# Patient Record
Sex: FEMALE | Race: WHITE | NOT HISPANIC OR LATINO | Employment: FULL TIME | RURAL
[De-identification: names, ages, dates, MRNs, and addresses within clinical notes are randomized per-mention and may not be internally consistent; named-entity substitution may affect disease eponyms.]

---

## 2017-06-14 RX ORDER — THYROID,PORK 90 MG
TABLET ORAL
Qty: 90 TABLET | Refills: 1 | Status: SHIPPED | OUTPATIENT
Start: 2017-06-14 | End: 2017-12-11 | Stop reason: SDUPTHER

## 2017-12-11 RX ORDER — THYROID,PORK 90 MG
TABLET ORAL
Qty: 90 TABLET | Refills: 0 | Status: SHIPPED | OUTPATIENT
Start: 2017-12-11 | End: 2018-03-15 | Stop reason: SDUPTHER

## 2017-12-11 NOTE — TELEPHONE ENCOUNTER
Patient was called and advised due lab.  Patient will be in first week of January to have this done.

## 2018-01-30 DIAGNOSIS — E03.9 ACQUIRED HYPOTHYROIDISM: Primary | ICD-10-CM

## 2018-02-01 DIAGNOSIS — E03.9 ACQUIRED HYPOTHYROIDISM: ICD-10-CM

## 2018-03-15 RX ORDER — THYROID,PORK 90 MG
TABLET ORAL
Qty: 90 TABLET | Refills: 2 | Status: SHIPPED | OUTPATIENT
Start: 2018-03-15 | End: 2018-12-09 | Stop reason: SDUPTHER

## 2018-08-17 ENCOUNTER — OFFICE VISIT (OUTPATIENT)
Dept: FAMILY MEDICINE CLINIC | Facility: CLINIC | Age: 28
End: 2018-08-17

## 2018-08-17 VITALS
TEMPERATURE: 97.8 F | HEART RATE: 69 BPM | SYSTOLIC BLOOD PRESSURE: 104 MMHG | BODY MASS INDEX: 23.88 KG/M2 | DIASTOLIC BLOOD PRESSURE: 58 MMHG | HEIGHT: 63 IN | WEIGHT: 134.8 LBS | OXYGEN SATURATION: 99 %

## 2018-08-17 DIAGNOSIS — R52 PAIN: Primary | ICD-10-CM

## 2018-08-17 PROCEDURE — 99213 OFFICE O/P EST LOW 20 MIN: CPT | Performed by: FAMILY MEDICINE

## 2018-08-17 RX ORDER — PRENATAL VIT NO.126/IRON/FOLIC 28MG-0.8MG
TABLET ORAL DAILY
COMMUNITY

## 2018-08-17 NOTE — PROGRESS NOTES
Subjective   Yumiko Zuleta [facility] is a 28 y.o. female.     Ankle Pain    The incident occurred 5 to 7 days ago. The incident occurred at home. There was no injury mechanism. The pain is present in the right ankle and right foot. The quality of the pain is described as aching. The pain is moderate. The pain has been fluctuating since onset. Associated symptoms include an inability to bear weight. She reports no foreign bodies present. The symptoms are aggravated by weight bearing. She has tried elevation and ice for the symptoms. The treatment provided no relief.       The following portions of the patient's history were reviewed and updated as appropriate: allergies, current medications, past family history, past medical history, past social history, past surgical history and problem list.    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 She appears well-developed and well-nourished.   Musculoskeletal: She exhibits edema, tenderness and deformity.   Lateral malleolar swelling and tenderness   Neurological: She is alert and oriented to person, place, and time.   Skin: Capillary refill takes less than 2 seconds. There is pallor.   Psychiatric: She has a normal mood and affect. Her behavior is normal. Judgment and thought content normal.   Nursing note and vitals reviewed.      Assessment/Plan   Yumiko was seen today for ankle pain.    Diagnoses and all orders for this visit:    Pain           Plan nonweightbearing ankle support Aleve-foot and ankle orthopedist for acute and long-term management of congenital defect with calluses

## 2018-10-29 ENCOUNTER — TELEPHONE (OUTPATIENT)
Dept: FAMILY MEDICINE CLINIC | Facility: CLINIC | Age: 28
End: 2018-10-29

## 2018-12-10 RX ORDER — THYROID,PORK 90 MG
TABLET ORAL
Qty: 90 TABLET | Refills: 2 | Status: SHIPPED | OUTPATIENT
Start: 2018-12-10 | End: 2020-03-04 | Stop reason: SDUPTHER

## 2019-02-04 ENCOUNTER — TELEPHONE (OUTPATIENT)
Dept: FAMILY MEDICINE CLINIC | Facility: CLINIC | Age: 29
End: 2019-02-04

## 2019-02-04 NOTE — TELEPHONE ENCOUNTER
Patient has sore throat, laryngitis.  Is there any thing she can take?              Zyrtec-10 mg-1 a day plus gargle with ice salt water

## 2019-02-05 ENCOUNTER — TELEPHONE (OUTPATIENT)
Dept: FAMILY MEDICINE CLINIC | Facility: CLINIC | Age: 29
End: 2019-02-05

## 2019-02-05 NOTE — TELEPHONE ENCOUNTER
HOARSE, PROD COUGH-YELLOW SPUTUM, DOESN'T FEEL BAD, NO FEVER. CAN SHE TAKE SOMETHING OTC OR RX?        [facility]        *the same as yesterday

## 2019-07-09 ENCOUNTER — TELEPHONE (OUTPATIENT)
Dept: PSYCHIATRY | Facility: CLINIC | Age: 29
End: 2019-07-09

## 2019-12-17 RX ORDER — THYROID,PORK 90 MG
TABLET ORAL
Qty: 90 TABLET | Refills: 0 | OUTPATIENT
Start: 2019-12-17

## 2019-12-17 NOTE — TELEPHONE ENCOUNTER
Spoke with pt.  States she does not need refill at this time.  Will call back with phone/fax number to send orders for lab.

## 2019-12-18 ENCOUNTER — TELEPHONE (OUTPATIENT)
Dept: FAMILY MEDICINE CLINIC | Facility: CLINIC | Age: 29
End: 2019-12-18

## 2019-12-18 NOTE — TELEPHONE ENCOUNTER
PT STATES SHE WILL NEED NEW RX FOR ARMOUR THRYOID SENT TO NEW PHARMACY-[facility]. PT STATES SHE HAS ENOUGH MED FOR NOW-SYSTEM SENT OUT REFILL YESTERDAY TOO EARLY.    CAN COMPLETE LABS-PLEASE SEND AN ORDER TO [facility] IN [facility]  [address] [place] [address]  [phone] (M)

## 2020-02-11 ENCOUNTER — TELEPHONE (OUTPATIENT)
Dept: FAMILY MEDICINE CLINIC | Facility: CLINIC | Age: 30
End: 2020-02-11

## 2020-02-11 DIAGNOSIS — E03.9 ACQUIRED HYPOTHYROIDISM: Primary | ICD-10-CM

## 2020-02-11 DIAGNOSIS — Z00.00 ANNUAL PHYSICAL EXAM: ICD-10-CM

## 2020-02-11 NOTE — TELEPHONE ENCOUNTER
PT IS NEEDING THYROID LAB ORDER AND ANY OTHER BLOOD WORK YOU MIGHT NEED TO CHECK  FAXED TO BELOW.  HAS ABOUT 3 WEEKS OF MEDS REMAINING.       [facility]  FAX:   [phone]  MAIN:  [phone]

## 2020-02-20 ENCOUNTER — HOSPITAL ENCOUNTER (OUTPATIENT)
Age: 30
Discharge: HOME | End: 2020-02-20
Payer: COMMERCIAL

## 2020-02-20 DIAGNOSIS — Z00.00: ICD-10-CM

## 2020-02-20 DIAGNOSIS — E03.9: Primary | ICD-10-CM

## 2020-02-20 LAB
ALANINE AMINOTRANSFERASE: 13 U/L (ref 4–35)
ALBUMIN LEVEL: 4.5 G/DL (ref 3.5–5.1)
ALBUMIN SPEC-MCNC: 4.5 G/DL (ref 3.5–5.1)
ALKALINE PHOSPHATASE: 65 U/L (ref 38–126)
ALP SERPL-CCNC: 65 U/L (ref 38–126)
ALT SERPL-CCNC: 13 U/L (ref 4–35)
ASPARTATE AMINO TRANSFERASE: 25 U/L (ref 14–36)
AST SERPL-CCNC: 25 U/L (ref 14–36)
BASIC METABOLIC AND HEMATOCRIT PNL BLD: 41.1 % (ref 37–47)
BILIRUBIN,TOTAL: 0.5 MG/DL (ref 0.2–1.3)
BLOOD UREA NITROGEN: 10 MG/DL (ref 7–17)
BUN SERPL-MCNC: 10 MG/DL (ref 7–17)
CALCIUM: 10 MG/DL (ref 8.4–10.2)
CARBON DIOXIDE: 29 MMOL/L (ref 22–30)
CHLORIDE SPEC-MCNC: 99 MMOL/L (ref 98–107)
CHLORIDE: 99 MMOL/L (ref 98–107)
CHOLEST SERPL-MCNC: 165 MG/DL (ref 0–200)
CHOLESTEROL: 165 MG/DL (ref 0–200)
ESTIMATED GLOMERULAR FILT RATE: > 60
FREE T4 FREE THYROXINE: 0.52 NG/ML (ref 0.78–2.19)
GFRSERPLBLD MDRD-ARVRAT: > 60 ML/MIN/{1.73_M2}
GLUCOSE SERPL-MCNC: 88 MG/DL (ref 65–105)
GLUCOSE: 88 MG/DL (ref 65–105)
HDL DIRECT: 63 MG/DL
HEMATOCRIT: 41.1 % (ref 37–47)
HEMOGLOBIN: 13.3 G/DL (ref 12–15)
HGB+HCT PNL BLD: 41.1 % (ref 37–47)
IPF CHARGE: (no result)
MCV RBC: 93.2 FL (ref 80–100)
MEAN CORPUSCULAR HEMOGLOBIN: 30.2 PG (ref 26–34)
MEAN CORPUSCULAR HGB CONC: 32.4 G/DL (ref 32–36)
PLATELET COUNT RESULT: 406 K/MM3 (ref 150–375)
POTASSIUM: 4.5 MMOL/L (ref 3.4–5)
PROT SERPL-MCNC: 8 G/DL (ref 6.3–8.2)
RED BLOOD COUNT: 4.41 M/MM3 (ref 4.2–5.4)
SODIUM: 140 MMOL/L (ref 137–145)
T4 FREE SERPL-MCNC: 0.52 NG/ML (ref 0.78–2.19)
THYROID STIMULATING HORMONE: 0.17 UIU/ML (ref 0.47–4.68)
TOTAL PROTEIN: 8 G/DL (ref 6.3–8.2)
TRIGLYCERIDES: 53 MG/DL (ref ?–150)
TSH SERPL-ACNC: 0.17 UIU/ML (ref 0.47–4.68)
WHITE BLOOD COUNT: 9.1 K/MM3 (ref 4.5–10)

## 2020-02-20 PROCEDURE — 84439 ASSAY OF FREE THYROXINE: CPT

## 2020-02-20 PROCEDURE — 84481 FREE ASSAY (FT-3): CPT

## 2020-02-20 PROCEDURE — 84443 ASSAY THYROID STIM HORMONE: CPT

## 2020-02-20 PROCEDURE — 80061 LIPID PANEL: CPT

## 2020-02-20 PROCEDURE — 80053 COMPREHEN METABOLIC PANEL: CPT

## 2020-02-20 PROCEDURE — 36415 COLL VENOUS BLD VENIPUNCTURE: CPT

## 2020-02-20 PROCEDURE — 85027 COMPLETE CBC AUTOMATED: CPT

## 2020-02-25 ENCOUNTER — RESULTS ENCOUNTER (OUTPATIENT)
Dept: FAMILY MEDICINE CLINIC | Facility: CLINIC | Age: 30
End: 2020-02-25

## 2020-02-25 DIAGNOSIS — Z00.00 ANNUAL PHYSICAL EXAM: ICD-10-CM

## 2020-02-25 DIAGNOSIS — E03.9 ACQUIRED HYPOTHYROIDISM: ICD-10-CM

## 2020-03-04 RX ORDER — LEVOTHYROXINE AND LIOTHYRONINE 57; 13.5 UG/1; UG/1
90 TABLET ORAL DAILY
Qty: 90 TABLET | Refills: 0 | Status: SHIPPED | OUTPATIENT
Start: 2020-03-04

## 2020-03-04 NOTE — TELEPHONE ENCOUNTER
PT IS NEEDING MED REFILL FOR ARMOUR THYROID      HAS ATTEMPTED TO LOCATE NEW MD BEING TOLD ITS 6-8 WKS OUT FOR APPT-ADVISED PT TO ESTABLISH CARE WITH ONE OF THEM SO THEY COULD FOLLOW REFILLS. WE COULD SEND IN 90 DAYS.       [facility], [place]

## 2021-04-14 ENCOUNTER — HOSPITAL ENCOUNTER (EMERGENCY)
Age: 31
Discharge: HOME | End: 2021-04-14
Payer: COMMERCIAL

## 2021-04-14 VITALS
OXYGEN SATURATION: 100 % | DIASTOLIC BLOOD PRESSURE: 71 MMHG | SYSTOLIC BLOOD PRESSURE: 113 MMHG | RESPIRATION RATE: 14 BRPM | HEART RATE: 61 BPM

## 2021-04-14 VITALS
RESPIRATION RATE: 20 BRPM | TEMPERATURE: 98.42 F | SYSTOLIC BLOOD PRESSURE: 152 MMHG | DIASTOLIC BLOOD PRESSURE: 96 MMHG | OXYGEN SATURATION: 99 % | HEART RATE: 99 BPM

## 2021-04-14 VITALS
OXYGEN SATURATION: 100 % | RESPIRATION RATE: 14 BRPM | HEART RATE: 70 BPM | TEMPERATURE: 97.6 F | SYSTOLIC BLOOD PRESSURE: 120 MMHG | DIASTOLIC BLOOD PRESSURE: 71 MMHG

## 2021-04-14 DIAGNOSIS — M79.604: Primary | ICD-10-CM

## 2021-04-14 LAB
ADD MANUAL DIFF: NO
ALANINE AMINOTRANSFERASE: 14 U/L (ref 4–35)
ALBUMIN LEVEL: 4.6 G/DL (ref 3.5–5.1)
ALBUMIN SPEC-MCNC: 4.6 G/DL (ref 3.5–5.1)
ALKALINE PHOSPHATASE: 48 U/L (ref 38–126)
ALP SERPL-CCNC: 48 U/L (ref 38–126)
ALT SERPL-CCNC: 14 U/L (ref 4–35)
ANION GAP: 7 MMOL/L (ref 8–16)
ASPARTATE AMINO TRANSFERASE: 29 U/L (ref 14–36)
AST SERPL-CCNC: 29 U/L (ref 14–36)
BASIC METABOLIC AND HEMATOCRIT PNL BLD: 41.3 % (ref 37–47)
BILIRUBIN,TOTAL: 0.4 MG/DL (ref 0.2–1.3)
BLOOD UREA NITROGEN: 14 MG/DL (ref 7–17)
BUN SERPL-MCNC: 14 MG/DL (ref 7–17)
CALCIUM: 9.5 MG/DL (ref 8.4–10.2)
CARBON DIOXIDE: 28 MMOL/L (ref 22–30)
CHLORIDE SPEC-MCNC: 106 MMOL/L (ref 98–107)
CHLORIDE: 106 MMOL/L (ref 98–107)
CREAT CL PREDICTED SERPL C-G-VRATE: 92 ML/MIN
DELTA APTT PPP: 31.5 SECONDS (ref 22.3–36.8)
ESTIMATED CRCL CALCULATION: 92 ML/MIN
ESTIMATED GLOMERULAR FILT RATE: > 60
GFRSERPLBLD MDRD-ARVRAT: > 60 ML/MIN/{1.73_M2}
GLUCOSE SERPL-MCNC: 110 MG/DL (ref 65–105)
GLUCOSE: 110 MG/DL (ref 65–105)
HEMATOCRIT: 41.3 % (ref 37–47)
HEMOGLOBIN: 13.9 G/DL (ref 12–15)
HGB+HCT PNL BLD: 41.3 % (ref 37–47)
IMM GRANULOCYTES NFR BLD AUTO: 0.2 % (ref 0–0.5)
INR: 1
IPF CHARGE: (no result)
LYMPHOCYTES # SPEC AUTO: 4.47 K/MM3 (ref 0.9–3.2)
MCV RBC: 92 FL (ref 80–100)
MEAN CORPUSCULAR HEMOGLOBIN: 31 PG (ref 26–34)
MEAN CORPUSCULAR HGB CONC: 33.7 G/DL (ref 32–36)
NUCLEATED RED BLOOD CELLS ABSOLUTE AUTO: 0 K/MM3 (ref 0–0.01)
NUCLEATED RED BLOOD CELLS PERC: 0 % (ref 0–0.2)
PARTIAL THROMBOPLASTIN TIME: 31.5 SECONDS (ref 22.3–36.8)
PLATELET COUNT RESULT: 357 K/MM3 (ref 150–375)
POTASSIUM: 3.9 MMOL/L (ref 3.4–5)
PROT SERPL-MCNC: 8 G/DL (ref 6.3–8.2)
PROTHROMBIN TIME: 13.5 SECONDS (ref 11.1–14.7)
RED BLOOD COUNT: 4.49 M/MM3 (ref 4.2–5.4)
SODIUM: 141 MMOL/L (ref 137–145)
TOTAL PROTEIN: 8 G/DL (ref 6.3–8.2)
WHITE BLOOD COUNT: 10.9 K/MM3 (ref 4.5–10)

## 2021-04-14 PROCEDURE — 36415 COLL VENOUS BLD VENIPUNCTURE: CPT

## 2021-04-14 PROCEDURE — 85610 PROTHROMBIN TIME: CPT

## 2021-04-14 PROCEDURE — 99283 EMERGENCY DEPT VISIT LOW MDM: CPT

## 2021-04-14 PROCEDURE — 85025 COMPLETE CBC W/AUTO DIFF WBC: CPT

## 2021-04-14 PROCEDURE — 80053 COMPREHEN METABOLIC PANEL: CPT

## 2021-04-14 PROCEDURE — 85730 THROMBOPLASTIN TIME PARTIAL: CPT

## 2021-04-14 PROCEDURE — 85380 FIBRIN DEGRADJ D-DIMER: CPT

## 2023-04-13 NOTE — TELEPHONE ENCOUNTER
ERROR   furosemide (oral/injection)  Pronunciation:  yenny bloom  Brand:  Lasix  What is the most important information I should know about furosemide? You should not use this medicine if you are unable to urinate. Do not take more than your recommended dose. High doses of furosemide may cause irreversible hearing loss. What is furosemide? Furosemide is a loop diuretic (water pill) that prevents your body from absorbing too much salt. This allows the salt to instead be passed in your urine. Furosemide is used to treat fluid retention (edema) in people with congestive heart failure, liver disease, or a kidney disorder such as nephrotic syndrome. Furosemide is also used to treat high blood pressure (hypertension). Furosemide may also be used for purposes not listed in this medication guide. What should I discuss with my healthcare provider before taking furosemide? You should not use furosemide if you are allergic to it, or if you are unable to urinate. Tell your doctor if you have ever had:  kidney disease;  enlarged prostate, bladder obstruction, urination problems;  cirrhosis or other liver disease;  an electrolyte imbalance (such as low levels of potassium or magnesium in your blood);  gout;  lupus;  diabetes; or  a sulfa drug allergy. Tell your doctor if you have an MRI (magnetic resonance imaging) or any type of scan using a radioactive dye that is injected into your veins. Both contrast dyes and furosemide can harm your kidneys. It is not known whether this medicine will harm an unborn baby. Tell your doctor if you are pregnant or plan to become pregnant. It may not be safe to breastfeed while using this medicine. Ask your doctor about any risk. Furosemide may slow breast milk production. How should I take furosemide? Follow all directions on your prescription label and read all medication guides or instruction sheets. Your doctor may occasionally change your dose.  Use the medicine exactly as